# Patient Record
Sex: MALE | Race: WHITE | NOT HISPANIC OR LATINO | Employment: OTHER | URBAN - METROPOLITAN AREA
[De-identification: names, ages, dates, MRNs, and addresses within clinical notes are randomized per-mention and may not be internally consistent; named-entity substitution may affect disease eponyms.]

---

## 2023-03-14 ENCOUNTER — HOSPITAL ENCOUNTER (OUTPATIENT)
Dept: DATA CONVERSION | Facility: HOSPITAL | Age: 68
Discharge: HOME | End: 2023-03-14

## 2023-06-06 LAB
ALANINE AMINOTRANSFERASE (SGPT) (U/L) IN SER/PLAS: 15 U/L (ref 10–52)
ALBUMIN (G/DL) IN SER/PLAS: 4.2 G/DL (ref 3.4–5)
ALKALINE PHOSPHATASE (U/L) IN SER/PLAS: 69 U/L (ref 33–136)
ANION GAP IN SER/PLAS: 10 MMOL/L (ref 10–20)
ASPARTATE AMINOTRANSFERASE (SGOT) (U/L) IN SER/PLAS: 17 U/L (ref 9–39)
BASOPHILS (10*3/UL) IN BLOOD BY AUTOMATED COUNT: 0.04 X10E9/L (ref 0–0.1)
BASOPHILS/100 LEUKOCYTES IN BLOOD BY AUTOMATED COUNT: 1 % (ref 0–2)
BILIRUBIN TOTAL (MG/DL) IN SER/PLAS: 0.7 MG/DL (ref 0–1.2)
CALCIUM (MG/DL) IN SER/PLAS: 9.9 MG/DL (ref 8.6–10.3)
CARBON DIOXIDE, TOTAL (MMOL/L) IN SER/PLAS: 31 MMOL/L (ref 21–32)
CHLORIDE (MMOL/L) IN SER/PLAS: 98 MMOL/L (ref 98–107)
CREATININE (MG/DL) IN SER/PLAS: 1.01 MG/DL (ref 0.5–1.3)
EOSINOPHILS (10*3/UL) IN BLOOD BY AUTOMATED COUNT: 0.23 X10E9/L (ref 0–0.7)
EOSINOPHILS/100 LEUKOCYTES IN BLOOD BY AUTOMATED COUNT: 5.6 % (ref 0–6)
ERYTHROCYTE DISTRIBUTION WIDTH (RATIO) BY AUTOMATED COUNT: 11.9 % (ref 11.5–14.5)
ERYTHROCYTE MEAN CORPUSCULAR HEMOGLOBIN CONCENTRATION (G/DL) BY AUTOMATED: 33 G/DL (ref 32–36)
ERYTHROCYTE MEAN CORPUSCULAR VOLUME (FL) BY AUTOMATED COUNT: 99 FL (ref 80–100)
ERYTHROCYTES (10*6/UL) IN BLOOD BY AUTOMATED COUNT: 4.04 X10E12/L (ref 4.5–5.9)
GFR MALE: 81 ML/MIN/1.73M2
GLUCOSE (MG/DL) IN SER/PLAS: 112 MG/DL (ref 74–99)
HEMATOCRIT (%) IN BLOOD BY AUTOMATED COUNT: 40 % (ref 41–52)
HEMOGLOBIN (G/DL) IN BLOOD: 13.2 G/DL (ref 13.5–17.5)
IMMATURE GRANULOCYTES/100 LEUKOCYTES IN BLOOD BY AUTOMATED COUNT: 0.2 % (ref 0–0.9)
LEUKOCYTES (10*3/UL) IN BLOOD BY AUTOMATED COUNT: 4.1 X10E9/L (ref 4.4–11.3)
LYMPHOCYTES (10*3/UL) IN BLOOD BY AUTOMATED COUNT: 0.86 X10E9/L (ref 1.2–4.8)
LYMPHOCYTES/100 LEUKOCYTES IN BLOOD BY AUTOMATED COUNT: 20.8 % (ref 13–44)
MONOCYTES (10*3/UL) IN BLOOD BY AUTOMATED COUNT: 0.47 X10E9/L (ref 0.1–1)
MONOCYTES/100 LEUKOCYTES IN BLOOD BY AUTOMATED COUNT: 11.4 % (ref 2–10)
NEUTROPHILS (10*3/UL) IN BLOOD BY AUTOMATED COUNT: 2.53 X10E9/L (ref 1.2–7.7)
NEUTROPHILS/100 LEUKOCYTES IN BLOOD BY AUTOMATED COUNT: 61 % (ref 40–80)
PLATELETS (10*3/UL) IN BLOOD AUTOMATED COUNT: 353 X10E9/L (ref 150–450)
POTASSIUM (MMOL/L) IN SER/PLAS: 3.6 MMOL/L (ref 3.5–5.3)
PROTEIN TOTAL: 7.1 G/DL (ref 6.4–8.2)
SODIUM (MMOL/L) IN SER/PLAS: 135 MMOL/L (ref 136–145)
UREA NITROGEN (MG/DL) IN SER/PLAS: 21 MG/DL (ref 6–23)

## 2023-08-25 LAB
ALANINE AMINOTRANSFERASE (SGPT) (U/L) IN SER/PLAS: 30 U/L (ref 10–52)
ALBUMIN (G/DL) IN SER/PLAS: 4.2 G/DL (ref 3.4–5)
ALKALINE PHOSPHATASE (U/L) IN SER/PLAS: 48 U/L (ref 33–136)
ANION GAP IN SER/PLAS: 10 MMOL/L (ref 10–20)
ASPARTATE AMINOTRANSFERASE (SGOT) (U/L) IN SER/PLAS: 21 U/L (ref 9–39)
BASOPHILS (10*3/UL) IN BLOOD BY AUTOMATED COUNT: 0.06 X10E9/L (ref 0–0.1)
BASOPHILS/100 LEUKOCYTES IN BLOOD BY AUTOMATED COUNT: 1.5 % (ref 0–2)
BILIRUBIN TOTAL (MG/DL) IN SER/PLAS: 0.6 MG/DL (ref 0–1.2)
CALCIUM (MG/DL) IN SER/PLAS: 9.8 MG/DL (ref 8.6–10.3)
CARBON DIOXIDE, TOTAL (MMOL/L) IN SER/PLAS: 33 MMOL/L (ref 21–32)
CHLORIDE (MMOL/L) IN SER/PLAS: 102 MMOL/L (ref 98–107)
CREATININE (MG/DL) IN SER/PLAS: 0.81 MG/DL (ref 0.5–1.3)
EOSINOPHILS (10*3/UL) IN BLOOD BY AUTOMATED COUNT: 0.24 X10E9/L (ref 0–0.7)
EOSINOPHILS/100 LEUKOCYTES IN BLOOD BY AUTOMATED COUNT: 5.8 % (ref 0–6)
ERYTHROCYTE DISTRIBUTION WIDTH (RATIO) BY AUTOMATED COUNT: 13.3 % (ref 11.5–14.5)
ERYTHROCYTE MEAN CORPUSCULAR HEMOGLOBIN CONCENTRATION (G/DL) BY AUTOMATED: 32.2 G/DL (ref 32–36)
ERYTHROCYTE MEAN CORPUSCULAR VOLUME (FL) BY AUTOMATED COUNT: 95 FL (ref 80–100)
ERYTHROCYTES (10*6/UL) IN BLOOD BY AUTOMATED COUNT: 4.25 X10E12/L (ref 4.5–5.9)
GFR MALE: >90 ML/MIN/1.73M2
GLUCOSE (MG/DL) IN SER/PLAS: 105 MG/DL (ref 74–99)
HEMATOCRIT (%) IN BLOOD BY AUTOMATED COUNT: 40.4 % (ref 41–52)
HEMOGLOBIN (G/DL) IN BLOOD: 13 G/DL (ref 13.5–17.5)
IMMATURE GRANULOCYTES/100 LEUKOCYTES IN BLOOD BY AUTOMATED COUNT: 0.2 % (ref 0–0.9)
LEUKOCYTES (10*3/UL) IN BLOOD BY AUTOMATED COUNT: 4.1 X10E9/L (ref 4.4–11.3)
LYMPHOCYTES (10*3/UL) IN BLOOD BY AUTOMATED COUNT: 0.88 X10E9/L (ref 1.2–4.8)
LYMPHOCYTES/100 LEUKOCYTES IN BLOOD BY AUTOMATED COUNT: 21.3 % (ref 13–44)
MONOCYTES (10*3/UL) IN BLOOD BY AUTOMATED COUNT: 0.53 X10E9/L (ref 0.1–1)
MONOCYTES/100 LEUKOCYTES IN BLOOD BY AUTOMATED COUNT: 12.8 % (ref 2–10)
NEUTROPHILS (10*3/UL) IN BLOOD BY AUTOMATED COUNT: 2.41 X10E9/L (ref 1.2–7.7)
NEUTROPHILS/100 LEUKOCYTES IN BLOOD BY AUTOMATED COUNT: 58.4 % (ref 40–80)
PLATELETS (10*3/UL) IN BLOOD AUTOMATED COUNT: 298 X10E9/L (ref 150–450)
POTASSIUM (MMOL/L) IN SER/PLAS: 4.6 MMOL/L (ref 3.5–5.3)
PROSTATE SPECIFIC AG (NG/ML) IN SER/PLAS: <0.1 NG/ML (ref 0–4)
PROTEIN TOTAL: 6.8 G/DL (ref 6.4–8.2)
SODIUM (MMOL/L) IN SER/PLAS: 140 MMOL/L (ref 136–145)
TESTOSTERONE (NG/DL) IN SER/PLAS: <60 NG/DL (ref 240–1000)
UREA NITROGEN (MG/DL) IN SER/PLAS: 21 MG/DL (ref 6–23)

## 2023-11-16 DIAGNOSIS — C61 PROSTATE CANCER (MULTI): Primary | ICD-10-CM

## 2023-11-17 ENCOUNTER — LAB (OUTPATIENT)
Dept: LAB | Facility: LAB | Age: 68
End: 2023-11-17
Payer: MEDICARE

## 2023-11-17 DIAGNOSIS — C61 PROSTATE CANCER (MULTI): ICD-10-CM

## 2023-11-17 LAB
ALBUMIN SERPL BCP-MCNC: 4.4 G/DL (ref 3.4–5)
ALP SERPL-CCNC: 64 U/L (ref 33–136)
ALT SERPL W P-5'-P-CCNC: 28 U/L (ref 10–52)
ANION GAP SERPL CALC-SCNC: 9 MMOL/L (ref 10–20)
AST SERPL W P-5'-P-CCNC: 23 U/L (ref 9–39)
BASOPHILS # BLD AUTO: 0.04 X10*3/UL (ref 0–0.1)
BASOPHILS NFR BLD AUTO: 0.9 %
BILIRUB SERPL-MCNC: 0.8 MG/DL (ref 0–1.2)
BUN SERPL-MCNC: 22 MG/DL (ref 6–23)
CALCIUM SERPL-MCNC: 9.7 MG/DL (ref 8.6–10.3)
CHLORIDE SERPL-SCNC: 102 MMOL/L (ref 98–107)
CO2 SERPL-SCNC: 31 MMOL/L (ref 21–32)
CREAT SERPL-MCNC: 0.79 MG/DL (ref 0.5–1.3)
EOSINOPHIL # BLD AUTO: 0.21 X10*3/UL (ref 0–0.7)
EOSINOPHIL NFR BLD AUTO: 4.6 %
ERYTHROCYTE [DISTWIDTH] IN BLOOD BY AUTOMATED COUNT: 13.4 % (ref 11.5–14.5)
GFR SERPL CREATININE-BSD FRML MDRD: >90 ML/MIN/1.73M*2
GLUCOSE SERPL-MCNC: 101 MG/DL (ref 74–99)
HCT VFR BLD AUTO: 41.5 % (ref 41–52)
HGB BLD-MCNC: 13.5 G/DL (ref 13.5–17.5)
IMM GRANULOCYTES # BLD AUTO: 0.01 X10*3/UL (ref 0–0.7)
IMM GRANULOCYTES NFR BLD AUTO: 0.2 % (ref 0–0.9)
LYMPHOCYTES # BLD AUTO: 0.96 X10*3/UL (ref 1.2–4.8)
LYMPHOCYTES NFR BLD AUTO: 21.1 %
MCH RBC QN AUTO: 30.9 PG (ref 26–34)
MCHC RBC AUTO-ENTMCNC: 32.5 G/DL (ref 32–36)
MCV RBC AUTO: 95 FL (ref 80–100)
MONOCYTES # BLD AUTO: 0.5 X10*3/UL (ref 0.1–1)
MONOCYTES NFR BLD AUTO: 11 %
NEUTROPHILS # BLD AUTO: 2.83 X10*3/UL (ref 1.2–7.7)
NEUTROPHILS NFR BLD AUTO: 62.2 %
NRBC BLD-RTO: 0 /100 WBCS (ref 0–0)
PLATELET # BLD AUTO: 305 X10*3/UL (ref 150–450)
POTASSIUM SERPL-SCNC: 4.3 MMOL/L (ref 3.5–5.3)
PROT SERPL-MCNC: 6.5 G/DL (ref 6.4–8.2)
RBC # BLD AUTO: 4.37 X10*6/UL (ref 4.5–5.9)
SODIUM SERPL-SCNC: 138 MMOL/L (ref 136–145)
WBC # BLD AUTO: 4.6 X10*3/UL (ref 4.4–11.3)

## 2023-11-17 PROCEDURE — 80053 COMPREHEN METABOLIC PANEL: CPT

## 2023-11-17 PROCEDURE — 85025 COMPLETE CBC W/AUTO DIFF WBC: CPT

## 2023-11-17 PROCEDURE — 36415 COLL VENOUS BLD VENIPUNCTURE: CPT

## 2023-11-17 PROCEDURE — 84153 ASSAY OF PSA TOTAL: CPT

## 2023-11-17 PROCEDURE — 84403 ASSAY OF TOTAL TESTOSTERONE: CPT

## 2023-11-18 LAB
PSA SERPL-MCNC: <0.1 NG/ML
TESTOST SERPL-MCNC: 271 NG/DL (ref 240–1000)

## 2023-11-22 ENCOUNTER — TELEPHONE (OUTPATIENT)
Dept: RADIATION ONCOLOGY | Facility: HOSPITAL | Age: 68
End: 2023-11-22
Payer: MEDICARE

## 2023-11-22 PROBLEM — C61 PROSTATE CANCER (MULTI): Status: ACTIVE | Noted: 2023-11-22

## 2023-11-22 PROBLEM — R00.1 BRADYCARDIA: Status: ACTIVE | Noted: 2023-11-22

## 2023-11-22 PROBLEM — K52.0 RADIATION ENTERITIS: Status: ACTIVE | Noted: 2023-06-28

## 2023-11-22 PROBLEM — K56.600 PARTIAL SMALL BOWEL OBSTRUCTION (MULTI): Status: ACTIVE | Noted: 2023-06-28

## 2023-11-22 PROBLEM — R10.9 ABDOMINAL PAIN: Status: ACTIVE | Noted: 2023-06-27

## 2023-11-22 RX ORDER — CIPROFLOXACIN 500 MG/1
500 TABLET ORAL 2 TIMES DAILY
COMMUNITY
Start: 2023-02-09

## 2023-11-22 RX ORDER — PREDNISONE 5 MG/1
5 TABLET ORAL DAILY
COMMUNITY
Start: 2023-03-09

## 2023-11-22 RX ORDER — LOSARTAN POTASSIUM AND HYDROCHLOROTHIAZIDE 25; 100 MG/1; MG/1
1 TABLET ORAL DAILY
COMMUNITY
Start: 2023-09-05

## 2023-11-22 RX ORDER — TAMSULOSIN HYDROCHLORIDE 0.4 MG/1
0.4 CAPSULE ORAL DAILY
COMMUNITY
Start: 2023-01-24 | End: 2024-02-26 | Stop reason: SDUPTHER

## 2023-11-22 RX ORDER — LORAZEPAM 0.5 MG/1
0.5 TABLET ORAL
COMMUNITY
Start: 2023-07-28

## 2023-11-22 RX ORDER — ENALAPRIL MALEATE 10 MG/1
10 TABLET ORAL
COMMUNITY

## 2023-11-22 RX ORDER — DOXYCYCLINE 100 MG/1
100 CAPSULE ORAL 2 TIMES DAILY
COMMUNITY
Start: 2023-07-28

## 2023-11-22 RX ORDER — DICLOFENAC SODIUM 75 MG/1
75 TABLET, DELAYED RELEASE ORAL
COMMUNITY
Start: 2023-02-25

## 2023-11-22 RX ORDER — LINACLOTIDE 290 UG/1
290 CAPSULE, GELATIN COATED ORAL
COMMUNITY
Start: 2023-06-02

## 2023-11-22 RX ORDER — METOPROLOL TARTRATE 100 MG/1
100 TABLET ORAL
COMMUNITY
Start: 2023-03-16

## 2023-11-22 RX ORDER — ATORVASTATIN CALCIUM 10 MG/1
10 TABLET, FILM COATED ORAL DAILY
COMMUNITY
Start: 2023-08-28

## 2023-11-22 RX ORDER — DICYCLOMINE HYDROCHLORIDE 10 MG/1
20 CAPSULE ORAL 3 TIMES DAILY PRN
COMMUNITY
Start: 2023-06-08

## 2023-11-22 NOTE — TELEPHONE ENCOUNTER
Called pt. to remind of appointment on 11/27/2023 at 1:00 pm with FERNANDA Herring. Pt's phone went to voicemail left number if needs to reschedule.

## 2023-11-27 ENCOUNTER — HOSPITAL ENCOUNTER (OUTPATIENT)
Dept: RADIATION ONCOLOGY | Facility: HOSPITAL | Age: 68
Setting detail: RADIATION/ONCOLOGY SERIES
Discharge: HOME | End: 2023-11-27
Payer: MEDICARE

## 2023-11-27 ENCOUNTER — EDUCATION (OUTPATIENT)
Dept: HEMATOLOGY/ONCOLOGY | Facility: HOSPITAL | Age: 68
End: 2023-11-27

## 2023-11-27 VITALS
BODY MASS INDEX: 25 KG/M2 | DIASTOLIC BLOOD PRESSURE: 76 MMHG | OXYGEN SATURATION: 98 % | WEIGHT: 168.76 LBS | TEMPERATURE: 96.6 F | HEIGHT: 69 IN | HEART RATE: 73 BPM | RESPIRATION RATE: 18 BRPM | SYSTOLIC BLOOD PRESSURE: 146 MMHG

## 2023-11-27 DIAGNOSIS — C61 PROSTATE CANCER (MULTI): Primary | ICD-10-CM

## 2023-11-27 PROCEDURE — 99213 OFFICE O/P EST LOW 20 MIN: CPT

## 2023-11-27 ASSESSMENT — ENCOUNTER SYMPTOMS
HEMATURIA: 0
FREQUENCY: 0
CHEST TIGHTNESS: 0
CONSTIPATION: 0
PALPITATIONS: 0
BACK PAIN: 0
ARTHRALGIAS: 0
JOINT SWELLING: 0
DIFFICULTY URINATING: 0
OCCASIONAL FEELINGS OF UNSTEADINESS: 0
PSYCHIATRIC NEGATIVE: 1
COUGH: 0
ANAL BLEEDING: 0
ALLERGIC/IMMUNOLOGIC NEGATIVE: 1
ABDOMINAL PAIN: 0
DIZZINESS: 0
DIARRHEA: 0
UNEXPECTED WEIGHT CHANGE: 0
SHORTNESS OF BREATH: 0
FATIGUE: 0
LOSS OF SENSATION IN FEET: 0
RECTAL PAIN: 0
DEPRESSION: 0
FEVER: 0
WEAKNESS: 0
DYSURIA: 0
BLOOD IN STOOL: 0

## 2023-11-27 ASSESSMENT — COLUMBIA-SUICIDE SEVERITY RATING SCALE - C-SSRS
2. HAVE YOU ACTUALLY HAD ANY THOUGHTS OF KILLING YOURSELF?: NO
1. IN THE PAST MONTH, HAVE YOU WISHED YOU WERE DEAD OR WISHED YOU COULD GO TO SLEEP AND NOT WAKE UP?: NO
6. HAVE YOU EVER DONE ANYTHING, STARTED TO DO ANYTHING, OR PREPARED TO DO ANYTHING TO END YOUR LIFE?: NO

## 2023-11-27 ASSESSMENT — PATIENT HEALTH QUESTIONNAIRE - PHQ9
2. FEELING DOWN, DEPRESSED OR HOPELESS: NOT AT ALL
1. LITTLE INTEREST OR PLEASURE IN DOING THINGS: NOT AT ALL
SUM OF ALL RESPONSES TO PHQ9 QUESTIONS 1 AND 2: 0

## 2023-11-27 ASSESSMENT — PAIN SCALES - GENERAL: PAINLEVEL: 0-NO PAIN

## 2023-11-27 NOTE — PROGRESS NOTES
Cancer synopsis:  Rad/onc: Dr. 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 Ct suggestive of radiation enteritis. Resolved and advanced to normal diet.       History of presenting illness:    Patient ID: [id number]     Sujit Navarrete is a 68 y.o. male who presents for his high risk prostate cancer 4+4=8, IPSA 6 now s/p 6m ADT, naraparib and AA/p per AUBREE 1821.    RT Site: prostate  RT Date: 03/2023  Hormone therapy: Yes, completed ADT, naraparib, AA/p 06/2023  Hot Flushes: Denies  Fatigue: Denies  Bone pain: Denies  MAGNO: 3  ED: Reports loss of erectile function since systemic therapy and RT.   ED medications: No  IPSS: 7  Urinary symptoms: Admits to nocturia and occasional urgency however denies dysuria, hematuria, frequency, urine leakage or weak stream.  Urinary Medications: Yes, flomax daily  Rectal bleeding: Denies  Gastric: denies ongoing bloating, constipation or bowel related issues.  Other systems: Denies SOB, CP or fever      Review of systems:  Review of Systems   Constitutional:  Negative for fatigue, fever and unexpected weight change.   Respiratory:  Negative for cough, chest tightness and shortness of breath.    Cardiovascular:  Negative for chest pain, palpitations and leg swelling.   Gastrointestinal:  Negative for abdominal pain,  anal bleeding, blood in stool, constipation, diarrhea and rectal pain.   Endocrine: Negative for cold intolerance, heat intolerance and polyuria.   Genitourinary:  Negative for decreased urine volume, difficulty urinating, dysuria, frequency, hematuria and urgency.   Musculoskeletal:  Negative for arthralgias, back pain, gait problem and joint swelling.   Skin: Negative.    Allergic/Immunologic: Negative.    Neurological:  Negative for dizziness, syncope and weakness.   Psychiatric/Behavioral: Negative.         Past Medical history  Past Medical History:   Diagnosis Date    Prostate cancer (CMS/HCC)         Surgical/family history  No family history on file.   Past Surgical History:   Procedure Laterality Date    CT ANGIO CORONARY ART WITH HEARTFLOW IF SCORE >30%  3/22/2023    CT HEART CORONARY ANGIOGRAM [facility] CT    US GUIDED PERCUTANEOUS PLACEMENT  2/18/2023    US GUIDED PERCUTANEOUS PLACEMENT [facility]        Social History  Tobacco Use: Low Risk  (11/27/2023)    Patient History     Smoking Tobacco Use: Never     Smokeless Tobacco Use: Never     Passive Exposure: Not on file         Current med list:  Current Outpatient Medications   Medication Instructions    atorvastatin (LIPITOR) 10 mg, oral, Daily    ciprofloxacin (CIPRO) 500 mg, oral, 2 times daily    diclofenac (VOLTAREN) 75 mg, oral, 2 times daily with meals    dicyclomine (BENTYL) 20 mg, oral, 3 times daily PRN    doxycycline (VIBRAMYCIN) 100 mg, oral, 2 times daily, Take for 12 days     enalapril (VASOTEC) 10 mg, oral, Daily RT    Linzess 290 mcg, oral, Daily before breakfast    LORazepam (ATIVAN) 0.5 mg, oral, Take 30 minutes prior to procedure     losartan-hydrochlorothiazide (Hyzaar) 100-25 mg tablet 1 tablet, oral, Daily    metoprolol tartrate (LOPRESSOR) 100 mg, oral, Take 2 hours prior to CTA    predniSONE (DELTASONE) 5 mg, oral, Daily    tamsulosin (FLOMAX) 0.4 mg, oral, Daily        Last recorded vital:  /76 (BP Location: Right arm, Patient  "Position: Sitting, BP Cuff Size: Adult)   Pulse 73   Temp 35.9 °C (96.6 °F) (Temporal)   Resp 18   Ht 1.746 m (5' 8.74\")   Wt 76.5 kg (168 lb 12.2 oz)   SpO2 98%   BMI 25.11 kg/m²     Physical exam  Physical Exam  Constitutional:       Appearance: Normal appearance.   Cardiovascular:      Rate and Rhythm: Normal rate.   Pulmonary:      Effort: Pulmonary effort is normal.      Breath sounds: Normal breath sounds.   Musculoskeletal:         General: Normal range of motion.      Cervical back: Normal range of motion.   Neurological:      Mental Status: He is alert and oriented to person, place, and time.   Psychiatric:         Mood and Affect: Mood normal.         Behavior: Behavior normal.         Thought Content: Thought content normal.         Judgment: Judgment normal.         Pertinent labs:  Prostate Specific AG   Date/Time Value Ref Range Status   11/17/2023 11:19 AM <0.10 <=4.00 ng/mL Final         Dx:  Problem List Items Addressed This Visit       Prostate cancer (CMS/HCC) - Primary    Relevant Orders    PSA, Total and Free    Testosterone    Comprehensive Metabolic Panel    CBC and Auto Differential    Clinic Appointment Request Follow Up; ESSIE HAAS; [facility]      PSA of <0.10 was reviewed and is undectable. Testerone now normalized at 273. Mild anemia likely related to RT and ADT agents improving. Review of latent SE including rectal bleeding, hematuria, urinary strictures, ED where reviewed as well as how to contact office if s/s present. Denies latent SE. Does admits to some erectile dysfunction  with Testerone recovery. Would like to continue monitoring at this time and will re-eval at next visit for PDE5i usage. to NCCN guidelines where reviewed and routine FUV of every 3m for first year and every 6m for four years for a total of five years was discussed. Patient verbalized understanding.     Recommend vitamin D supplementation, start (or increase by) 400-1000 units daily. " Reviewed importance of intake while on Testerone lowering therapy as well as after until Testerone re-establishes, at which point may stop if DEXA indicative of normal bone density. Also reviewed that individuals at a higher risk such as those over the age of 75 or those with a hx of bone fx, osteoporosis or osteopenia to continue supplementation indefinitely with routine DEXA imaging as per national guidelines to assess bone health continually.           PLAN:  FUV 3m  Labs Per AUBREE 1821 (CBC/CMP/PSA/ Testerone)  Imaging none  FUV other providers: PCP for routine evals, GI for colonoscopy at 1yr from RT, Cards for arrhythmia r/t AA/p.        Please contact office with any concerns:  Umberto Anna CNP  [phone]

## 2023-11-27 NOTE — PROGRESS NOTES
Research Note Follow Up Visit       Sujit Navarrete is here today for 6 mo. follow up on [id number].  Follow up procedures completed per protocol. Patient is aware of continued follow up plan.      Education Documentation  Monitoring Weight, taught by Nisha Gómez RN at 11/27/2023  3:17 PM.  Learner: Patient  Readiness: Acceptance  Method: Explanation  Response: Verbalizes Understanding    Tumor Markers, taught by Nisha Gómez RN at 11/27/2023  3:17 PM.  Learner: Patient  Readiness: Acceptance  Method: Explanation  Response: Verbalizes Understanding    When and How to Contact Clinic, taught by Nisha Gómez RN at 11/27/2023  3:17 PM.  Learner: Patient  Readiness: Acceptance  Method: Explanation  Response: Verbalizes Understanding    Education Comments  No comments found.

## 2024-02-21 ENCOUNTER — TELEPHONE (OUTPATIENT)
Dept: RADIATION ONCOLOGY | Facility: HOSPITAL | Age: 69
End: 2024-02-21
Payer: MEDICARE

## 2024-02-21 NOTE — TELEPHONE ENCOUNTER
Called pt. to remind of appointment on 2/26/2024 at 2:00 pm with FERNANDA Herring. Pt's phone went to voicemail left number if needs to reschedule.

## 2024-02-22 ENCOUNTER — LAB (OUTPATIENT)
Dept: LAB | Facility: LAB | Age: 69
End: 2024-02-22
Payer: MEDICARE

## 2024-02-22 DIAGNOSIS — C61 PROSTATE CANCER (MULTI): ICD-10-CM

## 2024-02-22 LAB
ALBUMIN SERPL BCP-MCNC: 4.3 G/DL (ref 3.4–5)
ALP SERPL-CCNC: 53 U/L (ref 33–136)
ALT SERPL W P-5'-P-CCNC: 23 U/L (ref 10–52)
ANION GAP SERPL CALC-SCNC: 9 MMOL/L (ref 10–20)
AST SERPL W P-5'-P-CCNC: 27 U/L (ref 9–39)
BASOPHILS # BLD AUTO: 0.06 X10*3/UL (ref 0–0.1)
BASOPHILS NFR BLD AUTO: 1.3 %
BILIRUB SERPL-MCNC: 0.8 MG/DL (ref 0–1.2)
BUN SERPL-MCNC: 18 MG/DL (ref 6–23)
CALCIUM SERPL-MCNC: 9.6 MG/DL (ref 8.6–10.3)
CHLORIDE SERPL-SCNC: 103 MMOL/L (ref 98–107)
CO2 SERPL-SCNC: 30 MMOL/L (ref 21–32)
CREAT SERPL-MCNC: 0.83 MG/DL (ref 0.5–1.3)
EGFRCR SERPLBLD CKD-EPI 2021: >90 ML/MIN/1.73M*2
EOSINOPHIL # BLD AUTO: 0.26 X10*3/UL (ref 0–0.7)
EOSINOPHIL NFR BLD AUTO: 5.6 %
ERYTHROCYTE [DISTWIDTH] IN BLOOD BY AUTOMATED COUNT: 13.2 % (ref 11.5–14.5)
GLUCOSE SERPL-MCNC: 99 MG/DL (ref 74–99)
HCT VFR BLD AUTO: 42.4 % (ref 41–52)
HGB BLD-MCNC: 13.8 G/DL (ref 13.5–17.5)
IMM GRANULOCYTES # BLD AUTO: 0.01 X10*3/UL (ref 0–0.7)
IMM GRANULOCYTES NFR BLD AUTO: 0.2 % (ref 0–0.9)
LYMPHOCYTES # BLD AUTO: 0.97 X10*3/UL (ref 1.2–4.8)
LYMPHOCYTES NFR BLD AUTO: 21 %
MCH RBC QN AUTO: 30.9 PG (ref 26–34)
MCHC RBC AUTO-ENTMCNC: 32.5 G/DL (ref 32–36)
MCV RBC AUTO: 95 FL (ref 80–100)
MONOCYTES # BLD AUTO: 0.54 X10*3/UL (ref 0.1–1)
MONOCYTES NFR BLD AUTO: 11.7 %
NEUTROPHILS # BLD AUTO: 2.78 X10*3/UL (ref 1.2–7.7)
NEUTROPHILS NFR BLD AUTO: 60.2 %
NRBC BLD-RTO: 0 /100 WBCS (ref 0–0)
PLATELET # BLD AUTO: 310 X10*3/UL (ref 150–450)
POTASSIUM SERPL-SCNC: 4.2 MMOL/L (ref 3.5–5.3)
PROT SERPL-MCNC: 6.5 G/DL (ref 6.4–8.2)
RBC # BLD AUTO: 4.46 X10*6/UL (ref 4.5–5.9)
SODIUM SERPL-SCNC: 138 MMOL/L (ref 136–145)
WBC # BLD AUTO: 4.6 X10*3/UL (ref 4.4–11.3)

## 2024-02-22 PROCEDURE — 80053 COMPREHEN METABOLIC PANEL: CPT

## 2024-02-22 PROCEDURE — 84154 ASSAY OF PSA FREE: CPT

## 2024-02-22 PROCEDURE — 84153 ASSAY OF PSA TOTAL: CPT

## 2024-02-22 PROCEDURE — 84403 ASSAY OF TOTAL TESTOSTERONE: CPT

## 2024-02-22 PROCEDURE — 85025 COMPLETE CBC W/AUTO DIFF WBC: CPT

## 2024-02-22 PROCEDURE — 36415 COLL VENOUS BLD VENIPUNCTURE: CPT

## 2024-02-23 LAB — TESTOST SERPL-MCNC: 420 NG/DL (ref 240–1000)

## 2024-02-24 LAB
PSA FREE MFR SERPL: NORMAL %
PSA FREE SERPL-MCNC: <0.1 NG/ML
PSA SERPL IA-MCNC: <0.1 NG/ML (ref 0–4)

## 2024-02-26 ENCOUNTER — APPOINTMENT (OUTPATIENT)
Dept: RADIATION ONCOLOGY | Facility: HOSPITAL | Age: 69
End: 2024-02-26
Payer: MEDICARE

## 2024-02-26 ENCOUNTER — HOSPITAL ENCOUNTER (OUTPATIENT)
Dept: RADIATION ONCOLOGY | Facility: HOSPITAL | Age: 69
Setting detail: RADIATION/ONCOLOGY SERIES
Discharge: HOME | End: 2024-02-26
Payer: MEDICARE

## 2024-02-26 VITALS
TEMPERATURE: 96.8 F | OXYGEN SATURATION: 99 % | BODY MASS INDEX: 26.36 KG/M2 | HEART RATE: 72 BPM | SYSTOLIC BLOOD PRESSURE: 133 MMHG | RESPIRATION RATE: 18 BRPM | WEIGHT: 173.94 LBS | DIASTOLIC BLOOD PRESSURE: 70 MMHG | HEIGHT: 68 IN

## 2024-02-26 DIAGNOSIS — R39.12 WEAK URINARY STREAM: ICD-10-CM

## 2024-02-26 DIAGNOSIS — C61 PROSTATE CANCER (MULTI): Primary | ICD-10-CM

## 2024-02-26 PROCEDURE — 99214 OFFICE O/P EST MOD 30 MIN: CPT

## 2024-02-26 RX ORDER — TAMSULOSIN HYDROCHLORIDE 0.4 MG/1
0.4 CAPSULE ORAL DAILY
Qty: 90 CAPSULE | Refills: 1 | Status: SHIPPED | OUTPATIENT
Start: 2024-02-26 | End: 2024-03-18 | Stop reason: SINTOL

## 2024-02-26 ASSESSMENT — ENCOUNTER SYMPTOMS
COUGH: 0
ARTHRALGIAS: 0
RECTAL PAIN: 0
ANAL BLEEDING: 0
SHORTNESS OF BREATH: 0
WEAKNESS: 0
ALLERGIC/IMMUNOLOGIC NEGATIVE: 1
DIZZINESS: 0
JOINT SWELLING: 0
CONSTIPATION: 0
OCCASIONAL FEELINGS OF UNSTEADINESS: 0
DEPRESSION: 0
DYSURIA: 0
LOSS OF SENSATION IN FEET: 0
FATIGUE: 0
FEVER: 0
CHEST TIGHTNESS: 0
ABDOMINAL PAIN: 0
FREQUENCY: 0
BLOOD IN STOOL: 0
PSYCHIATRIC NEGATIVE: 1
PALPITATIONS: 0
UNEXPECTED WEIGHT CHANGE: 0
BACK PAIN: 0
DIARRHEA: 0
DIFFICULTY URINATING: 0
HEMATURIA: 0

## 2024-02-26 ASSESSMENT — COLUMBIA-SUICIDE SEVERITY RATING SCALE - C-SSRS
6. HAVE YOU EVER DONE ANYTHING, STARTED TO DO ANYTHING, OR PREPARED TO DO ANYTHING TO END YOUR LIFE?: NO
2. HAVE YOU ACTUALLY HAD ANY THOUGHTS OF KILLING YOURSELF?: NO
1. IN THE PAST MONTH, HAVE YOU WISHED YOU WERE DEAD OR WISHED YOU COULD GO TO SLEEP AND NOT WAKE UP?: NO

## 2024-02-26 ASSESSMENT — PATIENT HEALTH QUESTIONNAIRE - PHQ9
SUM OF ALL RESPONSES TO PHQ9 QUESTIONS 1 AND 2: 0
1. LITTLE INTEREST OR PLEASURE IN DOING THINGS: NOT AT ALL
2. FEELING DOWN, DEPRESSED OR HOPELESS: NOT AT ALL

## 2024-02-26 ASSESSMENT — PAIN SCALES - GENERAL: PAINLEVEL: 0-NO PAIN

## 2024-02-26 NOTE — PROGRESS NOTES
Cancer synopsis:  Rad/onc: Dr. 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 Ct suggestive of radiation enteritis. Resolved and advanced to normal diet.     History of presenting illness:    Patient ID: [id number]     Sujit Navarrete is a 69 y.o. male who presents for his high risk prostate cancer 4+4=8, IPSA 6 now s/p 6m ADT, naraparib and AA/p per AUBREE 1821.    RT Site: prostate  RT Date: 03/2023  Hormone therapy: Yes, completed ADT, naraparib, AA/p 06/2023  Hot Flushes: Denies  Fatigue: Denies  Bone pain: Denies  MAGNO: 1  ED: Reports loss of erectile function since systemic therapy and RT not concerned at this time  ED medications: No  IPSS: 6  Urinary symptoms: Admits to nocturia and occasional urgency however denies dysuria, hematuria, frequency, urine leakage or weak stream. Did recently start flowmax with improved urinary emptying.  Urinary Medications: Yes, flomax daily  Rectal bleeding: Denies  Gastric: Denies ongoing bloating, constipation or bowel related issues.  Other systems: Denies SOB, CP or fever      Review of systems:  Review of Systems   Constitutional:  Negative for fatigue, fever and unexpected weight change.   Respiratory:  Negative for cough, chest tightness and shortness of breath.    Cardiovascular:   Negative for chest pain, palpitations and leg swelling.   Gastrointestinal:  Negative for abdominal pain, anal bleeding, blood in stool, constipation, diarrhea and rectal pain.   Endocrine: Negative for cold intolerance, heat intolerance and polyuria.   Genitourinary:  Negative for decreased urine volume, difficulty urinating, dysuria, frequency, hematuria and urgency.   Musculoskeletal:  Negative for arthralgias, back pain, gait problem and joint swelling.   Skin: Negative.    Allergic/Immunologic: Negative.    Neurological:  Negative for dizziness, syncope and weakness.   Psychiatric/Behavioral: Negative.         Past Medical history  Past Medical History:   Diagnosis Date    Prostate cancer (CMS/HCC)         Surgical/family history  No family history on file.   Past Surgical History:   Procedure Laterality Date    CT ANGIO CORONARY ART WITH HEARTFLOW IF SCORE >30%  3/22/2023    CT HEART CORONARY ANGIOGRAM [facility] CT    US GUIDED PERCUTANEOUS PLACEMENT  2/18/2023    US GUIDED PERCUTANEOUS PLACEMENT [facility]        Social History  Tobacco Use: Low Risk  (2/26/2024)    Patient History     Smoking Tobacco Use: Never     Smokeless Tobacco Use: Never     Passive Exposure: Not on file         Current med list:  Current Outpatient Medications   Medication Instructions    atorvastatin (LIPITOR) 10 mg, oral, Daily    ciprofloxacin (CIPRO) 500 mg, oral, 2 times daily    diclofenac (VOLTAREN) 75 mg, oral, 2 times daily with meals    dicyclomine (BENTYL) 20 mg, oral, 3 times daily PRN    doxycycline (VIBRAMYCIN) 100 mg, oral, 2 times daily, Take for 12 days     enalapril (VASOTEC) 10 mg, oral, Daily RT    Linzess 290 mcg, oral, Daily before breakfast    LORazepam (ATIVAN) 0.5 mg, oral, Take 30 minutes prior to procedure     losartan-hydrochlorothiazide (Hyzaar) 100-25 mg tablet 1 tablet, oral, Daily    metoprolol tartrate (LOPRESSOR) 100 mg, oral, Take 2 hours prior to CTA    predniSONE (DELTASONE) 5 mg, oral, Daily     "tamsulosin (FLOMAX) 0.4 mg, oral, Daily        Last recorded vital:  /70   Pulse 72   Temp 36 °C (96.8 °F) (Temporal)   Resp 18   Ht 1.727 m (5' 8\")   Wt 78.9 kg (173 lb 15.1 oz)   SpO2 99%   BMI 26.45 kg/m²     Physical exam  Physical Exam  Constitutional:       Appearance: Normal appearance.   Cardiovascular:      Rate and Rhythm: Normal rate.   Pulmonary:      Effort: Pulmonary effort is normal.      Breath sounds: Normal breath sounds.   Musculoskeletal:         General: Normal range of motion.      Cervical back: Normal range of motion.   Neurological:      Mental Status: He is alert and oriented to person, place, and time.   Psychiatric:         Mood and Affect: Mood normal.         Behavior: Behavior normal.         Thought Content: Thought content normal.         Judgment: Judgment normal.       ECO-zero  Pertinent labs:  Prostate Specific AG   Date/Time Value Ref Range Status   2023 11:19 AM <0.10 <=4.00 ng/mL Final     PSA   Date/Time Value Ref Range Status   2024 08:38 AM <0.1 0.0 - 4.0 ng/mL Final     Comment:     INTERPRETIVE INFORMATION: Prostate Specific Antigen    The Roche PSA electrochemiluminescent immunoassay is used. Results   obtained with different test methods or kits cannot be used   interchangeably. The Roche PSA method is approved for use as an   aid in the detection of prostate cancer when used in conjunction   with a digital rectal exam in individuals with a prostate age 50   years and older. The Roche PSA is also indicated for the serial   measurement of PSA to aid in the prognosis and management of   prostate cancer patients. Elevated PSA concentrations can only   suggest the presence of prostate cancer until biopsy is performed.   PSA concentrations can also be elevated in benign prostatic   hyperplasia or inflammatory conditions of the prostate. PSA is   generally not elevated in healthy individuals or individuals with   nonprostatic carcinoma. "         Dx:  Problem List Items Addressed This Visit       Prostate cancer (CMS/[facility]) - Primary    Relevant Orders    PSA, Total and Free    Testosterone    Comprehensive Metabolic Panel    CBC and Auto Differential    Clinic Appointment Request Follow Up; ESSIE HAAS; [facility] (Completed)    Clinic Appointment Request Follow Up; SAMINA ESSIE WOODARD; [facility]     Other Visit Diagnoses       Weak urinary stream        Relevant Medications    tamsulosin (Flomax) 0.4 mg 24 hr capsule           PSA of <0.10 was reviewed and is undectable. Testerone now normalized at 273. Mild anemia likely related to RT and ADT agents improving. Review of latent SE including rectal bleeding, hematuria, urinary strictures, ED where reviewed as well as how to contact office if s/s present. Denies latent SE. Does admits to some erectile dysfunction  with Testerone recovery. Would like to continue monitoring at this time and will re-eval at next visit for PDE5i usage. to NCCN guidelines where reviewed and routine FUV of every 3m for first year and every 6m for four years for a total of five years was discussed. Patient verbalized understanding.     Recommend vitamin D supplementation, start (or increase by) 400-1000 units daily. Reviewed importance of intake while on Testerone lowering therapy as well as after until Testerone re-establishes, at which point may stop if DEXA indicative of normal bone density. Also reviewed that individuals at a higher risk such as those over the age of 75 or those with a hx of bone fx, osteoporosis or osteopenia to continue supplementation indefinitely with routine DEXA imaging as per national guidelines to assess bone health continually.    Reviewed flowmax usage and will continue daily at this time. Refilled.       PLAN:  FUV 3m  Labs Per AUBREE 1821 (CBC/CMP/PSA/ Testerone)  Imaging none  Flowmax daily  FUV other providers: PCP for routine evals Cards for arrhythmia r/t  AA/p.        Please contact office with any concerns:  Umberto Anna CNP  [phone]

## 2024-02-29 ENCOUNTER — DOCUMENTATION (OUTPATIENT)
Dept: RADIATION ONCOLOGY | Facility: HOSPITAL | Age: 69
End: 2024-02-29
Payer: MEDICARE

## 2024-02-29 NOTE — RESEARCH NOTES
STUDY: [id number]  VISIT: 9M    Clinical Research Specialist assessed AE/Con Meds today.    Next appointment provided to patient with instructions for labs prior.     All questions answered to patient satisfaction.    Shelley Fabian  Clinical Research Specialist

## 2024-03-18 DIAGNOSIS — R39.12 WEAK URINARY STREAM: Primary | ICD-10-CM

## 2024-03-18 DIAGNOSIS — C61 MALIGNANT NEOPLASM OF PROSTATE (MULTI): Primary | ICD-10-CM

## 2024-03-18 RX ORDER — ALFUZOSIN HYDROCHLORIDE 10 MG/1
10 TABLET, EXTENDED RELEASE ORAL DAILY
Qty: 30 TABLET | Refills: 5 | Status: SHIPPED | OUTPATIENT
Start: 2024-03-18 | End: 2024-09-14

## 2024-05-21 ENCOUNTER — LAB (OUTPATIENT)
Dept: LAB | Facility: LAB | Age: 69
End: 2024-05-21
Payer: MEDICARE

## 2024-05-21 DIAGNOSIS — C61 PROSTATE CANCER (MULTI): ICD-10-CM

## 2024-05-21 LAB
ALBUMIN SERPL BCP-MCNC: 4.4 G/DL (ref 3.4–5)
ALP SERPL-CCNC: 59 U/L (ref 33–136)
ALT SERPL W P-5'-P-CCNC: 19 U/L (ref 10–52)
ANION GAP SERPL CALC-SCNC: 11 MMOL/L (ref 10–20)
AST SERPL W P-5'-P-CCNC: 21 U/L (ref 9–39)
BASOPHILS # BLD AUTO: 0.04 X10*3/UL (ref 0–0.1)
BASOPHILS NFR BLD AUTO: 0.9 %
BILIRUB SERPL-MCNC: 0.9 MG/DL (ref 0–1.2)
BUN SERPL-MCNC: 18 MG/DL (ref 6–23)
CALCIUM SERPL-MCNC: 9.1 MG/DL (ref 8.6–10.3)
CHLORIDE SERPL-SCNC: 103 MMOL/L (ref 98–107)
CO2 SERPL-SCNC: 30 MMOL/L (ref 21–32)
CREAT SERPL-MCNC: 0.92 MG/DL (ref 0.5–1.3)
EGFRCR SERPLBLD CKD-EPI 2021: 90 ML/MIN/1.73M*2
EOSINOPHIL # BLD AUTO: 0.15 X10*3/UL (ref 0–0.7)
EOSINOPHIL NFR BLD AUTO: 3.3 %
ERYTHROCYTE [DISTWIDTH] IN BLOOD BY AUTOMATED COUNT: 13.3 % (ref 11.5–14.5)
GLUCOSE SERPL-MCNC: 103 MG/DL (ref 74–99)
HCT VFR BLD AUTO: 43.3 % (ref 41–52)
HGB BLD-MCNC: 13.9 G/DL (ref 13.5–17.5)
IMM GRANULOCYTES # BLD AUTO: 0 X10*3/UL (ref 0–0.7)
IMM GRANULOCYTES NFR BLD AUTO: 0 % (ref 0–0.9)
LYMPHOCYTES # BLD AUTO: 0.84 X10*3/UL (ref 1.2–4.8)
LYMPHOCYTES NFR BLD AUTO: 18.3 %
MCH RBC QN AUTO: 30.3 PG (ref 26–34)
MCHC RBC AUTO-ENTMCNC: 32.1 G/DL (ref 32–36)
MCV RBC AUTO: 95 FL (ref 80–100)
MONOCYTES # BLD AUTO: 0.51 X10*3/UL (ref 0.1–1)
MONOCYTES NFR BLD AUTO: 11.1 %
NEUTROPHILS # BLD AUTO: 3.06 X10*3/UL (ref 1.2–7.7)
NEUTROPHILS NFR BLD AUTO: 66.4 %
NRBC BLD-RTO: 0 /100 WBCS (ref 0–0)
PLATELET # BLD AUTO: 283 X10*3/UL (ref 150–450)
POTASSIUM SERPL-SCNC: 4.5 MMOL/L (ref 3.5–5.3)
PROT SERPL-MCNC: 6.7 G/DL (ref 6.4–8.2)
RBC # BLD AUTO: 4.58 X10*6/UL (ref 4.5–5.9)
SODIUM SERPL-SCNC: 139 MMOL/L (ref 136–145)
WBC # BLD AUTO: 4.6 X10*3/UL (ref 4.4–11.3)

## 2024-05-22 LAB — TESTOST SERPL-MCNC: 455 NG/DL (ref 240–1000)

## 2024-05-28 ENCOUNTER — TELEPHONE (OUTPATIENT)
Dept: RADIATION ONCOLOGY | Facility: HOSPITAL | Age: 69
End: 2024-05-28
Payer: MEDICARE

## 2024-05-28 ENCOUNTER — APPOINTMENT (OUTPATIENT)
Dept: RADIATION ONCOLOGY | Facility: HOSPITAL | Age: 69
End: 2024-05-28
Payer: MEDICARE

## 2024-05-28 ASSESSMENT — ENCOUNTER SYMPTOMS
CHEST TIGHTNESS: 0
FATIGUE: 0
DYSURIA: 0
ARTHRALGIAS: 0
COUGH: 0
ALLERGIC/IMMUNOLOGIC NEGATIVE: 1
WEAKNESS: 0
FEVER: 0
ABDOMINAL PAIN: 0
BLOOD IN STOOL: 0
DIZZINESS: 0
SHORTNESS OF BREATH: 0
UNEXPECTED WEIGHT CHANGE: 0
ANAL BLEEDING: 0
BACK PAIN: 0
HEMATURIA: 0
JOINT SWELLING: 0
DIFFICULTY URINATING: 0
DIARRHEA: 0
PSYCHIATRIC NEGATIVE: 1
PALPITATIONS: 0
CONSTIPATION: 0
RECTAL PAIN: 0
FREQUENCY: 0

## 2024-05-28 NOTE — TELEPHONE ENCOUNTER
Called pt. to remind of appointment on 5/29/2024 at 2:30 pm with FERNANDA Herring. Pt's phone went to voicemail left number if needs to reschedule.

## 2024-05-28 NOTE — PROGRESS NOTES
Cancer synopsis:  Rad/onc: Dr. 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 Ct suggestive of radiation enteritis. Resolved and advanced to normal diet.     History of presenting illness:    Patient ID: [id number]     Sujit Navarrete is a 69 y.o. male who presents for his high risk prostate cancer 4+4=8, IPSA 6 now s/p 6m ADT, naraparib and AA/p per AUBREE 1821.    RT Site: prostate  RT Date: 03/2023  Hormone therapy: Yes, completed ADT, naraparib, AA/p 06/2023  Hot Flushes: Denies  Fatigue: Denies  Bone pain: Denies  ED: Reports loss of erectile function since systemic therapy and RT not concerned at this time  ED medications: No  IPSS: 6  Urinary symptoms: Admits to nocturia and occasional urgency however denies dysuria, hematuria, frequency, urine leakage or weak stream. Did recently start alfuzosin with improved urinary emptying.  Urinary Medications: Yes, alfuzosin  Rectal bleeding: Denies  Colonoscopy: none on file  Gastric: Denies ongoing bloating, constipation or bowel related issues.  Other systems: Denies SOB, CP or fever      Review of systems:  Review of Systems   Constitutional:  Negative for fatigue, fever and unexpected weight change.   Respiratory:  Negative for cough, chest tightness and shortness of breath.     Cardiovascular:  Negative for chest pain, palpitations and leg swelling.   Gastrointestinal:  Negative for abdominal pain, anal bleeding, blood in stool, constipation, diarrhea and rectal pain.   Endocrine: Negative for cold intolerance, heat intolerance and polyuria.   Genitourinary:  Negative for decreased urine volume, difficulty urinating, dysuria, frequency, hematuria and urgency.   Musculoskeletal:  Negative for arthralgias, back pain, gait problem and joint swelling.   Skin: Negative.    Allergic/Immunologic: Negative.    Neurological:  Negative for dizziness, syncope and weakness.   Psychiatric/Behavioral: Negative.         Past Medical history  Past Medical History:   Diagnosis Date    Prostate cancer (Multi)         Surgical/family history  No family history on file.   Past Surgical History:   Procedure Laterality Date    CT ANGIO CORONARY ART WITH HEARTFLOW IF SCORE >30%  3/22/2023    CT HEART CORONARY ANGIOGRAM [facility] CT    US GUIDED PERCUTANEOUS PLACEMENT  2/18/2023    US GUIDED PERCUTANEOUS PLACEMENT [facility]        Social History  Tobacco Use: Low Risk  (2/26/2024)    Patient History     Smoking Tobacco Use: Never     Smokeless Tobacco Use: Never     Passive Exposure: Not on file         Current med list:  Current Outpatient Medications   Medication Instructions    alfuzosin (UROXATRAL) 10 mg, oral, Daily, Do not crush, chew, or split.    atorvastatin (LIPITOR) 10 mg, oral, Daily    ciprofloxacin (CIPRO) 500 mg, oral, 2 times daily    diclofenac (VOLTAREN) 75 mg, oral, 2 times daily (morning and late afternoon)    dicyclomine (BENTYL) 20 mg, oral, 3 times daily PRN    doxycycline (VIBRAMYCIN) 100 mg, oral, 2 times daily, Take for 12 days     enalapril (VASOTEC) 10 mg, oral, Daily RT    Linzess 290 mcg, oral, Daily before breakfast    LORazepam (ATIVAN) 0.5 mg, oral, Take 30 minutes prior to procedure     losartan-hydrochlorothiazide (Hyzaar) 100-25 mg tablet 1 tablet, oral, Daily    metoprolol  tartrate (LOPRESSOR) 100 mg, oral, Take 2 hours prior to CTA    predniSONE (DELTASONE) 5 mg, oral, Daily        Last recorded vital:  /71   Pulse 90   Temp 36 °C (96.8 °F) (Temporal)   Resp 18   Wt 80.6 kg (177 lb 12.8 oz)   SpO2 99%   BMI 27.03 kg/m²     Physical exam  Physical Exam  Constitutional:       Appearance: Normal appearance.   Cardiovascular:      Rate and Rhythm: Normal rate.   Pulmonary:      Effort: Pulmonary effort is normal.      Breath sounds: Normal breath sounds.   Musculoskeletal:         General: Normal range of motion.      Cervical back: Normal range of motion.   Neurological:      Mental Status: He is alert and oriented to person, place, and time.   Psychiatric:         Mood and Affect: Mood normal.         Behavior: Behavior normal.         Thought Content: Thought content normal.         Judgment: Judgment normal.       ECO-zero  Pertinent labs:  Prostate Specific AG   Date/Time Value Ref Range Status   2023 11:19 AM <0.10 <=4.00 ng/mL Final     PSA   Date/Time Value Ref Range Status   2024 09:36 AM <0.1 0.0 - 4.0 ng/mL Final     Comment:     INTERPRETIVE INFORMATION: Prostate Specific Antigen    The Roche PSA electrochemiluminescent immunoassay is used. Results   obtained with different test methods or kits cannot be used   interchangeably. The Roche PSA method is approved for use as an   aid in the detection of prostate cancer when used in conjunction   with a digital rectal exam in individuals with a prostate age 50   years and older. The Roche PSA is also indicated for the serial   measurement of PSA to aid in the prognosis and management of   prostate cancer patients. Elevated PSA concentrations can only   suggest the presence of prostate cancer until biopsy is performed.   PSA concentrations can also be elevated in benign prostatic   hyperplasia or inflammatory conditions of the prostate. PSA is   generally not elevated in healthy individuals or  individuals with   nonprostatic carcinoma.         Dx:  Problem List Items Addressed This Visit       Prostate cancer (Multi) - Primary   PSA of <0.10 was reviewed and is undectable. Testerone now normalized at 273. Anemia now resolved. Review of latent SE including rectal bleeding, hematuria, urinary strictures, ED where reviewed as well as how to contact office if s/s present. Denies latent SE. Does admits to some erectile dysfunction  with Testerone recovery. Would like to continue monitoring at this time and will re-eval at next visit for PDE5i usage. to NCCN guidelines where reviewed and routine FUV of every 3m for first year and every 6m for four years for a total of five years was discussed. Patient verbalized understanding.     Recommend vitamin D supplementation, start (or increase by) 400-1000 units daily. Reviewed importance of intake while on Testerone lowering therapy as well as after until Testerone re-establishes, at which point may stop if DEXA indicative of normal bone density. Also reviewed that individuals at a higher risk such as those over the age of 75 or those with a hx of bone fx, osteoporosis or osteopenia to continue supplementation indefinitely with routine DEXA imaging as per national guidelines to assess bone health continually.    Reviewed alfuzosin usage and will continue daily at this time. Refilled.     PLAN:  FUV 6m  Labs Per AUBREE Gomez1 (CBC/CMP/PSA/ Testerone)  Imaging none  alfuzosin daily  FUV other providers: PCP for routine evals Cards for arrhythmia r/t AA/p.    Please contact office with any concerns:  Umberto Anna CNP  [phone]

## 2024-05-29 ENCOUNTER — HOSPITAL ENCOUNTER (OUTPATIENT)
Dept: RADIATION ONCOLOGY | Facility: HOSPITAL | Age: 69
Setting detail: RADIATION/ONCOLOGY SERIES
Discharge: HOME | End: 2024-05-29
Payer: MEDICARE

## 2024-05-29 VITALS
RESPIRATION RATE: 18 BRPM | TEMPERATURE: 96.8 F | OXYGEN SATURATION: 99 % | DIASTOLIC BLOOD PRESSURE: 71 MMHG | WEIGHT: 177.8 LBS | BODY MASS INDEX: 27.03 KG/M2 | SYSTOLIC BLOOD PRESSURE: 132 MMHG | HEART RATE: 90 BPM

## 2024-05-29 DIAGNOSIS — C61 PROSTATE CANCER (MULTI): Primary | ICD-10-CM

## 2024-05-29 PROCEDURE — 99214 OFFICE O/P EST MOD 30 MIN: CPT

## 2024-05-29 ASSESSMENT — COLUMBIA-SUICIDE SEVERITY RATING SCALE - C-SSRS
1. IN THE PAST MONTH, HAVE YOU WISHED YOU WERE DEAD OR WISHED YOU COULD GO TO SLEEP AND NOT WAKE UP?: NO
6. HAVE YOU EVER DONE ANYTHING, STARTED TO DO ANYTHING, OR PREPARED TO DO ANYTHING TO END YOUR LIFE?: NO
2. HAVE YOU ACTUALLY HAD ANY THOUGHTS OF KILLING YOURSELF?: NO

## 2024-05-29 ASSESSMENT — PATIENT HEALTH QUESTIONNAIRE - PHQ9
1. LITTLE INTEREST OR PLEASURE IN DOING THINGS: NOT AT ALL
2. FEELING DOWN, DEPRESSED OR HOPELESS: NOT AT ALL
SUM OF ALL RESPONSES TO PHQ9 QUESTIONS 1 AND 2: 0

## 2024-05-29 ASSESSMENT — PAIN SCALES - GENERAL: PAINLEVEL: 0-NO PAIN

## 2024-07-12 ENCOUNTER — DOCUMENTATION (OUTPATIENT)
Dept: RADIATION ONCOLOGY | Facility: HOSPITAL | Age: 69
End: 2024-07-12
Payer: MEDICARE

## 2024-07-12 NOTE — RESEARCH NOTES
STUDY: [id number]  VISIT: 12M (associated w 5/29/24 clinic visit)    QOLs completed by patient without assistance.     All questions answered to patient satisfaction.    Shelley Fabian  07/12/2024

## 2024-10-11 ENCOUNTER — TELEPHONE (OUTPATIENT)
Dept: RADIATION ONCOLOGY | Facility: HOSPITAL | Age: 69
End: 2024-10-11
Payer: MEDICARE

## 2024-10-11 DIAGNOSIS — R39.12 WEAK URINARY STREAM: Primary | ICD-10-CM

## 2024-10-11 RX ORDER — ALFUZOSIN HYDROCHLORIDE 10 MG/1
10 TABLET, EXTENDED RELEASE ORAL DAILY
Qty: 30 TABLET | Refills: 5 | Status: SHIPPED | OUTPATIENT
Start: 2024-10-11 | End: 2025-04-09

## 2024-11-04 DIAGNOSIS — R31.0 GROSS HEMATURIA: Primary | ICD-10-CM

## 2024-11-06 ENCOUNTER — LAB (OUTPATIENT)
Dept: LAB | Facility: LAB | Age: 69
End: 2024-11-06
Payer: MEDICARE

## 2024-11-06 DIAGNOSIS — E78.1 PURE HYPERGLYCERIDEMIA: ICD-10-CM

## 2024-11-06 DIAGNOSIS — R31.0 GROSS HEMATURIA: ICD-10-CM

## 2024-11-06 DIAGNOSIS — I10 ESSENTIAL (PRIMARY) HYPERTENSION: Primary | ICD-10-CM

## 2024-11-06 DIAGNOSIS — C61 PROSTATE CANCER (MULTI): ICD-10-CM

## 2024-11-06 LAB
ALBUMIN SERPL BCP-MCNC: 4.3 G/DL (ref 3.4–5)
ALP SERPL-CCNC: 51 U/L (ref 33–136)
ALT SERPL W P-5'-P-CCNC: 25 U/L (ref 10–52)
AMORPH CRY #/AREA UR COMP ASSIST: ABNORMAL /HPF
ANION GAP SERPL CALC-SCNC: 10 MMOL/L (ref 10–20)
APPEARANCE UR: ABNORMAL
AST SERPL W P-5'-P-CCNC: 23 U/L (ref 9–39)
BASOPHILS # BLD AUTO: 0.02 X10*3/UL (ref 0–0.1)
BASOPHILS NFR BLD AUTO: 0.4 %
BILIRUB SERPL-MCNC: 1.2 MG/DL (ref 0–1.2)
BILIRUB UR STRIP.AUTO-MCNC: NEGATIVE MG/DL
BUN SERPL-MCNC: 20 MG/DL (ref 6–23)
CALCIUM SERPL-MCNC: 9.5 MG/DL (ref 8.6–10.3)
CHLORIDE SERPL-SCNC: 104 MMOL/L (ref 98–107)
CHOLEST SERPL-MCNC: 188 MG/DL (ref 0–199)
CHOLESTEROL/HDL RATIO: 2.9
CO2 SERPL-SCNC: 29 MMOL/L (ref 21–32)
COLOR UR: ABNORMAL
CREAT SERPL-MCNC: 0.97 MG/DL (ref 0.5–1.3)
EGFRCR SERPLBLD CKD-EPI 2021: 85 ML/MIN/1.73M*2
EOSINOPHIL # BLD AUTO: 0.09 X10*3/UL (ref 0–0.7)
EOSINOPHIL NFR BLD AUTO: 2 %
ERYTHROCYTE [DISTWIDTH] IN BLOOD BY AUTOMATED COUNT: 12.9 % (ref 11.5–14.5)
GLUCOSE SERPL-MCNC: 98 MG/DL (ref 74–99)
GLUCOSE UR STRIP.AUTO-MCNC: NEGATIVE MG/DL
HCT VFR BLD AUTO: 41.6 % (ref 41–52)
HDLC SERPL-MCNC: 64.9 MG/DL
HGB BLD-MCNC: 13.7 G/DL (ref 13.5–17.5)
HOLD SPECIMEN: NORMAL
IMM GRANULOCYTES # BLD AUTO: 0.01 X10*3/UL (ref 0–0.7)
IMM GRANULOCYTES NFR BLD AUTO: 0.2 % (ref 0–0.9)
KETONES UR STRIP.AUTO-MCNC: NEGATIVE MG/DL
LDLC SERPL CALC-MCNC: 107 MG/DL
LEUKOCYTE ESTERASE UR QL STRIP.AUTO: NEGATIVE
LYMPHOCYTES # BLD AUTO: 0.99 X10*3/UL (ref 1.2–4.8)
LYMPHOCYTES NFR BLD AUTO: 21.9 %
MCH RBC QN AUTO: 31 PG (ref 26–34)
MCHC RBC AUTO-ENTMCNC: 32.9 G/DL (ref 32–36)
MCV RBC AUTO: 94 FL (ref 80–100)
MONOCYTES # BLD AUTO: 0.53 X10*3/UL (ref 0.1–1)
MONOCYTES NFR BLD AUTO: 11.7 %
MUCOUS THREADS #/AREA URNS AUTO: ABNORMAL /LPF
NEUTROPHILS # BLD AUTO: 2.88 X10*3/UL (ref 1.2–7.7)
NEUTROPHILS NFR BLD AUTO: 63.8 %
NITRITE UR QL STRIP.AUTO: NEGATIVE
NON HDL CHOLESTEROL: 123 MG/DL (ref 0–149)
NRBC BLD-RTO: 0 /100 WBCS (ref 0–0)
PH UR STRIP.AUTO: 5 [PH]
PLATELET # BLD AUTO: 306 X10*3/UL (ref 150–450)
POTASSIUM SERPL-SCNC: 4.1 MMOL/L (ref 3.5–5.3)
PROT SERPL-MCNC: 6.4 G/DL (ref 6.4–8.2)
PROT UR STRIP.AUTO-MCNC: ABNORMAL MG/DL
RBC # BLD AUTO: 4.42 X10*6/UL (ref 4.5–5.9)
RBC # UR STRIP.AUTO: ABNORMAL /UL
RBC #/AREA URNS AUTO: ABNORMAL /HPF
SODIUM SERPL-SCNC: 139 MMOL/L (ref 136–145)
SP GR UR STRIP.AUTO: 1.03
TESTOST SERPL-MCNC: 566 NG/DL (ref 240–1000)
TRIGL SERPL-MCNC: 79 MG/DL (ref 0–149)
UROBILINOGEN UR STRIP.AUTO-MCNC: <2 MG/DL
VLDL: 16 MG/DL (ref 0–40)
WBC # BLD AUTO: 4.5 X10*3/UL (ref 4.4–11.3)
WBC #/AREA URNS AUTO: ABNORMAL /HPF

## 2024-11-15 ENCOUNTER — TELEPHONE (OUTPATIENT)
Dept: RADIATION ONCOLOGY | Facility: HOSPITAL | Age: 69
End: 2024-11-15
Payer: MEDICARE

## 2024-11-15 NOTE — TELEPHONE ENCOUNTER
Called pt. to remind of appointment on 11/18/2024 at  1:30 pm with FERNANDA Herring. Pt's phone went to voicemail left number if needs to reschedule.

## 2024-11-18 ENCOUNTER — HOSPITAL ENCOUNTER (OUTPATIENT)
Dept: RADIATION ONCOLOGY | Facility: HOSPITAL | Age: 69
Setting detail: RADIATION/ONCOLOGY SERIES
Discharge: HOME | End: 2024-11-18
Payer: MEDICARE

## 2024-11-18 ENCOUNTER — DOCUMENTATION (OUTPATIENT)
Dept: RADIATION ONCOLOGY | Facility: HOSPITAL | Age: 69
End: 2024-11-18
Payer: MEDICARE

## 2024-11-18 VITALS
HEART RATE: 74 BPM | OXYGEN SATURATION: 97 % | SYSTOLIC BLOOD PRESSURE: 128 MMHG | BODY MASS INDEX: 26.82 KG/M2 | DIASTOLIC BLOOD PRESSURE: 74 MMHG | RESPIRATION RATE: 18 BRPM | WEIGHT: 176.4 LBS | TEMPERATURE: 98.2 F

## 2024-11-18 DIAGNOSIS — C61 MALIGNANT NEOPLASM OF PROSTATE (MULTI): Primary | ICD-10-CM

## 2024-11-18 DIAGNOSIS — C61 PROSTATE CANCER (MULTI): Primary | ICD-10-CM

## 2024-11-18 PROCEDURE — 99214 OFFICE O/P EST MOD 30 MIN: CPT

## 2024-11-18 ASSESSMENT — ENCOUNTER SYMPTOMS
HEMATURIA: 0
SHORTNESS OF BREATH: 0
ABDOMINAL PAIN: 0
ALLERGIC/IMMUNOLOGIC NEGATIVE: 1
DIZZINESS: 0
FEVER: 0
DIFFICULTY URINATING: 0
OCCASIONAL FEELINGS OF UNSTEADINESS: 0
CONSTIPATION: 0
DEPRESSION: 0
FREQUENCY: 0
PSYCHIATRIC NEGATIVE: 1
ARTHRALGIAS: 0
WEAKNESS: 0
CHEST TIGHTNESS: 0
ANAL BLEEDING: 0
BACK PAIN: 0
COUGH: 0
DIARRHEA: 0
UNEXPECTED WEIGHT CHANGE: 0
RECTAL PAIN: 0
FATIGUE: 0
PALPITATIONS: 0
LOSS OF SENSATION IN FEET: 0
BLOOD IN STOOL: 0
DYSURIA: 0
JOINT SWELLING: 0

## 2024-11-18 ASSESSMENT — COLUMBIA-SUICIDE SEVERITY RATING SCALE - C-SSRS
2. HAVE YOU ACTUALLY HAD ANY THOUGHTS OF KILLING YOURSELF?: NO
6. HAVE YOU EVER DONE ANYTHING, STARTED TO DO ANYTHING, OR PREPARED TO DO ANYTHING TO END YOUR LIFE?: NO
1. IN THE PAST MONTH, HAVE YOU WISHED YOU WERE DEAD OR WISHED YOU COULD GO TO SLEEP AND NOT WAKE UP?: NO

## 2024-11-18 ASSESSMENT — PATIENT HEALTH QUESTIONNAIRE - PHQ9
1. LITTLE INTEREST OR PLEASURE IN DOING THINGS: NOT AT ALL
SUM OF ALL RESPONSES TO PHQ9 QUESTIONS 1 AND 2: 0
2. FEELING DOWN, DEPRESSED OR HOPELESS: NOT AT ALL

## 2024-11-18 ASSESSMENT — PAIN SCALES - GENERAL: PAINLEVEL_OUTOF10: 6

## 2024-11-18 NOTE — PROGRESS NOTES
Cancer synopsis:  Rad/onc: Dr. 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 Ct suggestive of radiation enteritis. Resolved and advanced to normal diet.     History of presenting illness:    Patient ID: [id number]     Sujit Navarrete is a 69 y.o. male who presents for his high risk prostate cancer 4+4=8, IPSA 6 now s/p 6m ADT, naraparib and AA/p per AUBREE 1821.    RT Site: prostate  RT Date: 03/2023  Hormone therapy: Yes, completed ADT, naraparib, AA/p 06/2023  Hot Flushes: Denies  Fatigue: Denies  Bone pain: Denies  ED: Reports loss of erectile function since systemic therapy and RT not concerned at this time  ED medications: No  IPSS: 6  Urinary symptoms: Admits to nocturia and occasional urgency however denies dysuria, frequency, urine leakage or weak stream. Did recently stopped alfuzosin. Admits to one episode of hematuria about a month ago. Admits to ongoing painful urination that has been ongoing for 1 month was well. UA was negative for infection however completed course of abx. Recent UA demonstrating urine crystals was present. Denies hx of gout and or renal stones. Has a cup of coffee a day and a beer occasionally.   Urinary Medications: No  Rectal bleeding: Denies  Colonoscopy: none on file  Gastric: Denies ongoing  bloating, constipation or bowel related issues.  Other systems: Denies SOB, CP or fever    Review of systems:  Review of Systems   Constitutional:  Negative for fatigue, fever and unexpected weight change.   Respiratory:  Negative for cough, chest tightness and shortness of breath.    Cardiovascular:  Negative for chest pain, palpitations and leg swelling.   Gastrointestinal:  Negative for abdominal pain, anal bleeding, blood in stool, constipation, diarrhea and rectal pain.   Endocrine: Negative for cold intolerance, heat intolerance and polyuria.   Genitourinary:  Negative for decreased urine volume, difficulty urinating, dysuria, frequency, hematuria and urgency.   Musculoskeletal:  Negative for arthralgias, back pain, gait problem and joint swelling.   Skin: Negative.    Allergic/Immunologic: Negative.    Neurological:  Negative for dizziness, syncope and weakness.   Psychiatric/Behavioral: Negative.       Past Medical history  Past Medical History:   Diagnosis Date    Prostate cancer (Multi)       Surgical/family history  No family history on file.   Past Surgical History:   Procedure Laterality Date    CT ANGIO CORONARY ART WITH HEARTFLOW IF SCORE >30%  3/22/2023    CT HEART CORONARY ANGIOGRAM [facility] CT    US GUIDED PERCUTANEOUS PLACEMENT  2/18/2023    US GUIDED PERCUTANEOUS PLACEMENT [facility]      Social History  Tobacco Use: Low Risk  (9/6/2024)    Received from [facility]    Patient History     Smoking Tobacco Use: Never     Smokeless Tobacco Use: Never     Passive Exposure: Not on file       Current med list:  Current Outpatient Medications   Medication Instructions    alfuzosin (UROXATRAL) 10 mg, oral, Daily, Take with food.    atorvastatin (LIPITOR) 10 mg, Daily    ciprofloxacin (CIPRO) 500 mg, 2 times daily    diclofenac (VOLTAREN) 75 mg, 2 times daily (morning and late afternoon)    dicyclomine (BENTYL) 20 mg, 3 times daily PRN    doxycycline (VIBRAMYCIN) 100 mg, 2 times daily    enalapril  (VASOTEC) 10 mg, Daily RT    Linzess 290 mcg, Daily before breakfast    LORazepam (ATIVAN) 0.5 mg    losartan-hydrochlorothiazide (Hyzaar) 100-25 mg tablet 1 tablet, Daily    metoprolol tartrate (LOPRESSOR) 100 mg    predniSONE (DELTASONE) 5 mg, Daily      Last recorded vital:  /74   Pulse 74   Temp 36.8 °C (98.2 °F) (Temporal)   Resp 18   Wt 80 kg (176 lb 6.4 oz)   SpO2 97%   BMI 26.82 kg/m²     Physical exam  Physical Exam  Constitutional:       Appearance: Normal appearance.   Cardiovascular:      Rate and Rhythm: Normal rate.   Pulmonary:      Effort: Pulmonary effort is normal.      Breath sounds: Normal breath sounds.   Musculoskeletal:         General: Normal range of motion.      Cervical back: Normal range of motion.   Neurological:      Mental Status: He is alert and oriented to person, place, and time.   Psychiatric:         Mood and Affect: Mood normal.         Behavior: Behavior normal.         Thought Content: Thought content normal.         Judgment: Judgment normal.       ECO-zero  Pertinent labs:  Prostate Specific AG   Date/Time Value Ref Range Status   2023 11:19 AM <0.10 <=4.00 ng/mL Final     PSA   Date/Time Value Ref Range Status   2024 08:52 AM <0.1 0.0 - 4.0 ng/mL Final     Comment:     INTERPRETIVE INFORMATION: Prostate Specific Antigen    The Roche PSA electrochemiluminescent immunoassay is used. Results   obtained with different test methods or kits cannot be used   interchangeably. The Roche PSA method is approved for use as an   aid in the detection of prostate cancer when used in conjunction   with a digital rectal exam in individuals with a prostate age 50   years and older. The Roche PSA is also indicated for the serial   measurement of PSA to aid in the prognosis and management of   prostate cancer patients. Elevated PSA concentrations can only   suggest the presence of prostate cancer until biopsy is performed.   PSA concentrations can also be elevated  in benign prostatic   hyperplasia or inflammatory conditions of the prostate. PSA is   generally not elevated in healthy individuals or individuals with   nonprostatic carcinoma.     Dx:  Problem List Items Addressed This Visit       Prostate cancer (Multi) - Primary    Relevant Orders    PSA, Total and Free    Testosterone    Comprehensive Metabolic Panel    CBC and Auto Differential    Clinic Appointment Request Follow Up; ESSIE HAAS; [facility] (Completed)   PSA of <0.10 was reviewed and is undectable. Testerone now normalized at 566. All other labs within normal limits Review of latent SE including rectal bleeding, hematuria, urinary strictures, ED where reviewed as well as how to contact office if s/s present. Admits to new hematuria denies other latent SE. NCCN guidelines where reviewed and routine FUV of every 3m for first year and every 6m for four years for a total of five years was discussed. Patient verbalized understanding.     Recommend vitamin D supplementation, start (or increase by) 400-1000 units daily. Reviewed importance of intake while on Testerone lowering therapy as well as after until Testerone re-establishes, at which point may stop if DEXA indicative of normal bone density. Also reviewed that individuals at a higher risk such as those over the age of 75 or those with a hx of bone fx, osteoporosis or osteopenia to continue supplementation indefinitely with routine DEXA imaging as per national guidelines to assess bone health continually.    Reviewed alfuzosin usage and will continue daily at this time. Refilled.    Discussed differentials of ongoing dysuria and hematuria including bladder ca, cystitis, prostatitis and or renal stout/gout. Reviewed would be advisable to obtain another UA to assess crystal composition as well as meeting with urology. Patient would like to hold off at this time and monitor.      PLAN:  FUV 6m  Labs Per AUBREE 1821 (CBC/CMP/PSA/ Testerone)  Imaging  none  alfuzosin daily  FUV other providers: PCP for routine evals Cards for arrhythmia r/t AA/p.    Please contact office with any concerns:  Umberto Anna CNP  [phone]

## 2024-11-18 NOTE — RESEARCH NOTES
STUDY: [id number]  VISIT:    Clinical Research Specialist saw patient in clinic today.    Adverse Events and Concomitant Medications assessed.    Patient agreeable to 24M biopsy per discussion with Umberto today.    Next appointment and contact information provided.    All questions answered to patient satisfaction.    Shelley Fabian  11/18/2024

## 2024-11-27 ENCOUNTER — APPOINTMENT (OUTPATIENT)
Dept: RADIATION ONCOLOGY | Facility: HOSPITAL | Age: 69
End: 2024-11-27
Payer: MEDICARE

## 2025-04-29 ENCOUNTER — TELEPHONE (OUTPATIENT)
Dept: RADIATION ONCOLOGY | Facility: HOSPITAL | Age: 70
End: 2025-04-29
Payer: MEDICARE

## 2025-04-29 DIAGNOSIS — R39.12 WEAK URINARY STREAM: Primary | ICD-10-CM

## 2025-04-29 RX ORDER — ALFUZOSIN HYDROCHLORIDE 10 MG/1
10 TABLET, EXTENDED RELEASE ORAL DAILY
Qty: 30 TABLET | Refills: 11 | Status: SHIPPED | OUTPATIENT
Start: 2025-04-29 | End: 2026-04-29

## 2025-05-19 ENCOUNTER — LAB (OUTPATIENT)
Dept: LAB | Facility: HOSPITAL | Age: 70
End: 2025-05-19
Payer: MEDICARE

## 2025-05-19 ENCOUNTER — TELEPHONE (OUTPATIENT)
Dept: RADIATION ONCOLOGY | Facility: HOSPITAL | Age: 70
End: 2025-05-19

## 2025-05-19 ASSESSMENT — ENCOUNTER SYMPTOMS
FREQUENCY: 0
DIARRHEA: 0
DYSURIA: 0
WEAKNESS: 0
HEMATURIA: 0
ALLERGIC/IMMUNOLOGIC NEGATIVE: 1
COUGH: 0
UNEXPECTED WEIGHT CHANGE: 0
ABDOMINAL PAIN: 0
PALPITATIONS: 0
DIZZINESS: 0
PSYCHIATRIC NEGATIVE: 1
BLOOD IN STOOL: 0
DIFFICULTY URINATING: 0
RECTAL PAIN: 0
ANAL BLEEDING: 0
CONSTIPATION: 0
BACK PAIN: 0
FATIGUE: 0
ARTHRALGIAS: 0
FEVER: 0
CHEST TIGHTNESS: 0
SHORTNESS OF BREATH: 0
JOINT SWELLING: 0

## 2025-05-19 NOTE — PROGRESS NOTES
"Cancer synopsis:  Rad/onc: Dr. 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 Ct suggestive of radiation enteritis. Resolved and advanced to normal diet.     History of presenting illness:    Patient ID: [id number]     Sujit Navarrete is a 70 y.o. male who presents for his high risk prostate cancer 4+4=8, IPSA 6 now s/p 6m ADT, naraparib and AA/p per AUBREE 1821.    RT Site: prostate  RT Date: 03/2023  Hormone therapy: Yes, completed ADT, naraparib, AA/p 06/2023  Hot Flushes: Denies  Fatigue: Denies  Bone pain: Denies  ED: Reports loss of erectile function since systemic therapy and RT not concerned at this time  ED medications: No  IPSS: 6  Urinary symptoms: denies further episodes of hematuria, urine crystals and or dysuria,. Has remained on alfuzosin and reports \"long as I am on medication I do not have issues\" Denies hx of gout and or renal stones. Has a cup of coffee a day and a beer occasionally.   Urinary Medications: No  Rectal bleeding: Denies  Colonoscopy: none on file  Other systems: Denies SOB, CP or fever    Review of systems:  Review of Systems   Constitutional:  Negative for fatigue, fever and unexpected weight change.   Respiratory:  Negative for cough, chest tightness and shortness of breath.    Cardiovascular:  " Negative for chest pain, palpitations and leg swelling.   Gastrointestinal:  Negative for abdominal pain, anal bleeding, blood in stool, constipation, diarrhea and rectal pain.   Endocrine: Negative for cold intolerance, heat intolerance and polyuria.   Genitourinary:  Negative for decreased urine volume, difficulty urinating, dysuria, frequency, hematuria and urgency.   Musculoskeletal:  Negative for arthralgias, back pain, gait problem and joint swelling.   Skin: Negative.    Allergic/Immunologic: Negative.    Neurological:  Negative for dizziness, syncope and weakness.   Psychiatric/Behavioral: Negative.       Past Medical history  Past Medical History:   Diagnosis Date    Prostate cancer (Multi)       Surgical/family history  No family history on file.   Past Surgical History:   Procedure Laterality Date    CT ANGIO CORONARY ART WITH HEARTFLOW IF SCORE >30%  3/22/2023    CT HEART CORONARY ANGIOGRAM [facility] CT    US GUIDED PERCUTANEOUS PLACEMENT  2/18/2023    US GUIDED PERCUTANEOUS PLACEMENT [facility]      Social History  Tobacco Use: Low Risk  (1/15/2025)    Received from [facility]    Patient History     Smoking Tobacco Use: Never     Smokeless Tobacco Use: Never     Passive Exposure: Not on file       Current med list:  Current Outpatient Medications   Medication Instructions    alfuzosin (UROXATRAL) 10 mg, oral, Daily, Do not crush, chew, or split.    atorvastatin (LIPITOR) 10 mg, Daily    ciprofloxacin (CIPRO) 500 mg, 2 times daily    diclofenac (VOLTAREN) 75 mg, 2 times daily (morning and late afternoon)    dicyclomine (BENTYL) 20 mg, 3 times daily PRN    doxycycline (VIBRAMYCIN) 100 mg, 2 times daily    enalapril (VASOTEC) 10 mg, Daily RT    Linzess 290 mcg, Daily before breakfast    LORazepam (ATIVAN) 0.5 mg    losartan-hydrochlorothiazide (Hyzaar) 100-25 mg tablet 1 tablet, Daily    metoprolol tartrate (LOPRESSOR) 100 mg    predniSONE (DELTASONE) 5 mg, Daily      Last recorded vital:  There were no  vitals taken for this visit.    Physical Exam  Constitutional:       Appearance: Normal appearance.   Cardiovascular:      Rate and Rhythm: Normal rate.   Pulmonary:      Effort: Pulmonary effort is normal.      Breath sounds: Normal breath sounds.   Musculoskeletal:         General: Normal range of motion.      Cervical back: Normal range of motion.   Neurological:      Mental Status: He is alert and oriented to person, place, and time.   Psychiatric:         Mood and Affect: Mood normal.         Behavior: Behavior normal.         Thought Content: Thought content normal.         Judgment: Judgment normal.       ECO-zero  Pertinent labs:  Prostate Specific AG   Date/Time Value Ref Range Status   2023 11:19 AM <0.10 <=4.00 ng/mL Final     PSA   Date/Time Value Ref Range Status   2024 08:52 AM <0.1 0.0 - 4.0 ng/mL Final     Comment:     INTERPRETIVE INFORMATION: Prostate Specific Antigen    The Roche PSA electrochemiluminescent immunoassay is used. Results   obtained with different test methods or kits cannot be used   interchangeably. The Roche PSA method is approved for use as an   aid in the detection of prostate cancer when used in conjunction   with a digital rectal exam in individuals with a prostate age 50   years and older. The Roche PSA is also indicated for the serial   measurement of PSA to aid in the prognosis and management of   prostate cancer patients. Elevated PSA concentrations can only   suggest the presence of prostate cancer until biopsy is performed.   PSA concentrations can also be elevated in benign prostatic   hyperplasia or inflammatory conditions of the prostate. PSA is   generally not elevated in healthy individuals or individuals with   nonprostatic carcinoma.     Dx:  Problem List Items Addressed This Visit    None  Due for labs today will call with results. Review of latent SE including rectal bleeding, hematuria, urinary strictures, ED where reviewed as well as how to  contact office if s/s present. Admits to new hematuria denies other latent SE. NCCN guidelines where reviewed and routine FUV of every 3m for first year and every 6m for four years for a total of five years was discussed. Patient verbalized understanding.     BPH:  Reviewed alfuzosin usage and will continue daily at this time. Refilled.     PLAN:  FUV 6m  Labs Per AUBREE Gomez1 (CBC/CMP/PSA/ Testerone)  Imaging none  alfuzosin daily  FUV other providers: PCP for routine evals Cards for arrhythmia r/t AA/p.    Please contact office with any concerns:  Umberto Anna CNP  [phone]

## 2025-05-19 NOTE — TELEPHONE ENCOUNTER
Called pt. to remind of appointment on 5/20/2025 at  11:00 am with FERNANDA Herring. Pt's phone went to voicemail left number if needs to reschedule.

## 2025-05-20 ENCOUNTER — DOCUMENTATION (OUTPATIENT)
Dept: RADIATION ONCOLOGY | Facility: HOSPITAL | Age: 70
End: 2025-05-20

## 2025-05-20 ENCOUNTER — HOSPITAL ENCOUNTER (OUTPATIENT)
Dept: RADIATION ONCOLOGY | Facility: HOSPITAL | Age: 70
Setting detail: RADIATION/ONCOLOGY SERIES
Discharge: HOME | End: 2025-05-20
Payer: MEDICARE

## 2025-05-20 ENCOUNTER — LAB (OUTPATIENT)
Dept: LAB | Facility: HOSPITAL | Age: 70
End: 2025-05-20
Payer: MEDICARE

## 2025-05-20 VITALS
DIASTOLIC BLOOD PRESSURE: 71 MMHG | OXYGEN SATURATION: 97 % | HEART RATE: 77 BPM | SYSTOLIC BLOOD PRESSURE: 131 MMHG | BODY MASS INDEX: 26.08 KG/M2 | TEMPERATURE: 96.6 F | WEIGHT: 171.52 LBS | RESPIRATION RATE: 18 BRPM

## 2025-05-20 DIAGNOSIS — C61 PROSTATE CANCER (MULTI): ICD-10-CM

## 2025-05-20 DIAGNOSIS — C61 PROSTATE CANCER (MULTI): Primary | ICD-10-CM

## 2025-05-20 PROCEDURE — 99214 OFFICE O/P EST MOD 30 MIN: CPT

## 2025-05-20 PROCEDURE — 36415 COLL VENOUS BLD VENIPUNCTURE: CPT

## 2025-05-20 ASSESSMENT — COLUMBIA-SUICIDE SEVERITY RATING SCALE - C-SSRS
1. IN THE PAST MONTH, HAVE YOU WISHED YOU WERE DEAD OR WISHED YOU COULD GO TO SLEEP AND NOT WAKE UP?: NO
2. HAVE YOU ACTUALLY HAD ANY THOUGHTS OF KILLING YOURSELF?: NO
6. HAVE YOU EVER DONE ANYTHING, STARTED TO DO ANYTHING, OR PREPARED TO DO ANYTHING TO END YOUR LIFE?: NO

## 2025-05-20 ASSESSMENT — PAIN SCALES - GENERAL: PAINLEVEL_OUTOF10: 0-NO PAIN

## 2025-05-20 ASSESSMENT — ENCOUNTER SYMPTOMS
LOSS OF SENSATION IN FEET: 0
OCCASIONAL FEELINGS OF UNSTEADINESS: 0
DEPRESSION: 0

## 2025-05-20 NOTE — RESEARCH NOTES
STUDY: [id number]  VISIT: 24M    Clinical Research Specialist saw patient in clinic today.    Adverse Events and Concomitant Medications assessed.    QOLs completed by patient without assistance.     Next appointment and contact information provided.    All questions answered to patient satisfaction.    Shelley Fabian  05/20/2025

## 2025-06-24 ENCOUNTER — TELEPHONE (OUTPATIENT)
Dept: RADIATION ONCOLOGY | Facility: HOSPITAL | Age: 70
End: 2025-06-24
Payer: MEDICARE

## 2025-06-24 NOTE — TELEPHONE ENCOUNTER
Discussed 2yr [id number] bx protocol. Consdiering BX. Did review is not SOC and labs at this time would not necessarily indicate a need for bx however is valuable clinical information about his treatment progress.  Declined bx at this time